# Patient Record
Sex: FEMALE | Race: ASIAN | NOT HISPANIC OR LATINO | Employment: UNEMPLOYED | URBAN - NONMETROPOLITAN AREA
[De-identification: names, ages, dates, MRNs, and addresses within clinical notes are randomized per-mention and may not be internally consistent; named-entity substitution may affect disease eponyms.]

---

## 2019-04-06 LAB — BACTERIA SPEC AEROBE CULT: NO GROWTH

## 2019-04-10 LAB
EXTERNAL ABO GROUPING: (no result)
EXTERNAL HEMOGLOBIN: 12.9 G/DL
EXTERNAL RH FACTOR: POSITIVE
EXTERNAL THYROID STIMULATING HORMONE: 1.59 M[IU]/ML

## 2019-04-16 LAB
EXTERNAL HEPATITIS B SURFACE ANTIGEN: NEGATIVE
EXTERNAL SYPHILIS RPR SCREEN: (no result)
GLUCOSE P FAST SERPL-MCNC: 84.4 MG/DL
HCV AB S/CO SERPL IA: NONREACTIVE
HIV 1+2 AB+HIV1 P24 AG SERPL QL IA: NONREACTIVE
RUBV IGG SERPL IA-ACNC: (no result)

## 2019-07-04 LAB — 2ND TRIMESTER 4 SCREEN SERPL-IMP: NEGATIVE

## 2019-08-03 LAB
EXTERNAL HEMATOCRIT: 30 %
EXTERNAL HEMOGLOBIN: 11.7 G/DL

## 2019-09-04 ENCOUNTER — APPOINTMENT (OUTPATIENT)
Dept: LAB | Facility: HOSPITAL | Age: 29
End: 2019-09-04

## 2019-09-04 ENCOUNTER — INITIAL PRENATAL (OUTPATIENT)
Dept: OBSTETRICS AND GYNECOLOGY | Facility: CLINIC | Age: 29
End: 2019-09-04

## 2019-09-04 VITALS — WEIGHT: 141 LBS | SYSTOLIC BLOOD PRESSURE: 110 MMHG | DIASTOLIC BLOOD PRESSURE: 79 MMHG

## 2019-09-04 VITALS — DIASTOLIC BLOOD PRESSURE: 79 MMHG | SYSTOLIC BLOOD PRESSURE: 110 MMHG

## 2019-09-04 DIAGNOSIS — Z36.89 ENCOUNTER FOR ULTRASOUND TO ASSESS FETAL ANATOMY AND GROWTH IN TWIN PREGNANCY, ANTEPARTUM: ICD-10-CM

## 2019-09-04 DIAGNOSIS — Z34.02 ENCOUNTER FOR SUPERVISION OF NORMAL FIRST PREGNANCY IN SECOND TRIMESTER: Primary | ICD-10-CM

## 2019-09-04 DIAGNOSIS — Z36.9 ANTENATAL SCREENING ENCOUNTER: ICD-10-CM

## 2019-09-04 DIAGNOSIS — O30.009 ENCOUNTER FOR ULTRASOUND TO ASSESS FETAL ANATOMY AND GROWTH IN TWIN PREGNANCY, ANTEPARTUM: ICD-10-CM

## 2019-09-04 LAB
AMPHET+METHAMPHET UR QL: NEGATIVE
AMPHETAMINES UR QL: NEGATIVE
BARBITURATES UR QL SCN: NEGATIVE
BENZODIAZ UR QL SCN: NEGATIVE
BUPRENORPHINE SERPL-MCNC: NEGATIVE NG/ML
CANNABINOIDS SERPL QL: NEGATIVE
COCAINE UR QL: NEGATIVE
GLUCOSE 1H P 100 G GLC PO SERPL-MCNC: 127 MG/DL (ref 60–140)
METHADONE UR QL SCN: NEGATIVE
OPIATES UR QL: NEGATIVE
OXYCODONE UR QL SCN: NEGATIVE
PCP UR QL SCN: NEGATIVE
PROPOXYPH UR QL: NEGATIVE
TRICYCLICS UR QL SCN: NEGATIVE

## 2019-09-04 PROCEDURE — 80306 DRUG TEST PRSMV INSTRMNT: CPT | Performed by: FAMILY MEDICINE

## 2019-09-04 PROCEDURE — 36415 COLL VENOUS BLD VENIPUNCTURE: CPT | Performed by: FAMILY MEDICINE

## 2019-09-04 PROCEDURE — 82950 GLUCOSE TEST: CPT | Performed by: FAMILY MEDICINE

## 2019-09-04 PROCEDURE — 99203 OFFICE O/P NEW LOW 30 MIN: CPT | Performed by: FAMILY MEDICINE

## 2019-09-04 RX ORDER — PRENATAL VIT NO.126/IRON/FOLIC 28MG-0.8MG
TABLET ORAL DAILY
COMMUNITY
End: 2021-04-07

## 2019-09-04 NOTE — PROGRESS NOTES
"[facility]  Obstetrics Visit    CHIEF COMPLAINT:  New prenatal visit    HISTORY OF PRESENT ILLNESS:  Marco A Childs is a 29 y.o. y/o  at 25w5d by definite LMP (Patient's last menstrual period was 2019.) with Estimated Date of Delivery: 19 confirmed by 1st trimester ultrasound.  Patient is transferring into the practice from [facility] and has had good prenatal care. She came back to the [facility] in 2019.    She report good fetal movement, no LOF, no VB, no contractions.     This was an unplanned but welcomed pregnancy and the patient is supported by [facility] \"[facility]". Reports nausea and reflux early on in her pregnancy but reports that is now resolved.   Denies breast tenderness. She denies any vaginal bleeding. She has started taking a prenatal vitamin.    REVIEW OF SYSTEMS  Review of Systems  GEN: no fever or chills  CVS: no chest pain or shortness of breath, no palpitations  RESP: no cough or wheeze  GI: no vomiting, no diarrhea or constipation  : no dysuria, no vaginal bleeding, no vaginal discharge, no dysuria  SKIN: no rash or lesions  PSYCH: no sleep disturbance, no depression    PRENATAL RISK FACTORS   Problems (from 19 to present)     No problems associated with this episode.          DATING CRITERIA:   Patient's last menstrual period was 2019. - Estimated Date of Delivery: 19 working  1TUS (19 at 12w2d) -- LARISA 12/15/19    OBSTETRIC HISTORY:  OB History    Para Term  AB Living   1             SAB TAB Ectopic Molar Multiple Live Births                    # Outcome Date GA Lbr Patrick/2nd Weight Sex Delivery Anes PTL Lv   1 Current                 GYN HISTORY:  Denies h/o sexually transmitted infections/pelvic inflammatory disease  Denies h/o abnormal pap smears, last pap was 2019 and reported normal  Denies h/o gynecologic surgeries, including biopsies of the cervix    PAST MEDICAL HISTORY:  Past Medical History:   Diagnosis Date "   • Abnormal laboratory test result    • Acne vulgaris    • Constipation    • Encounter for initial prescription of contraceptive pills    • Encounter for surveillance of contraceptive pills    • Hirsutism    • Irregular periods    • Left lower quadrant pain      PAST SURGICAL HISTORY:  History reviewed. No pertinent surgical history.  FAMILY HISTORY:  Family History   Problem Relation Age of Onset   • Breast cancer Mother    • Heart disease Maternal Grandfather    • Heart attack Maternal Grandfather    • Hyperlipidemia Paternal Grandmother    • Hypertension Paternal Grandmother    • Diabetes Father    • No Known Problems Brother    • No Known Problems Sister    • No Known Problems Sister      SOCIAL HISTORY:  Social History     Socioeconomic History   • Marital status: Single     Spouse name: Not on file   • Number of children: Not on file   • Years of education: Not on file   • Highest education level: Not on file   Tobacco Use   • Smoking status: Never Smoker   • Smokeless tobacco: Never Used   Substance and Sexual Activity   • Alcohol use: No   • Drug use: No   • Sexual activity: Yes     Partners: Male     GENETIC SCREENING:  Age >34 yo as of LARISA: no  Thalassemia: no  NTD: no  CHD: no  Down Syndrome/MR/Fragile X/Autism: no  Ashkenazi Rastafari with Andrew-Sachs, [place], familial dysautonomia: no  Sickle cell disease or trait: no  Hemophilia: no  Muscular dystrophy: no  Cystic fibrosis: no  Kristin's chorea: no  Birth defects: no  Genetic/chromosomal disorders: no    INFECTION HISTORY:  TB exposure: no  HSV: no  Illness since LMP: no  Prior GBS infected child: no  STIs: no    ALLERGIES:  No Known Allergies  MEDICATIONS:  Prior to Admission medications    Medication Sig Start Date End Date Taking? Authorizing Provider   Benzoyl Peroxide 10 % bar Wash face and rinse with warm water 1 to 2 times per day    Provider, MD Rio   drospirenone-ethinyl estradiol (GIANVI) 3-0.02 MG per tablet Take 1 tablet by mouth  Daily.    Provider, MD Rio   Prenatal Vit-Fe Fumarate-FA (PRENATAL, CLASSIC, VITAMIN) 28-0.8 MG tablet tablet Take  by mouth Daily.    Provider, MD Rio   tretinoin (RETIN-A) 0.025 % gel Apply  topically Every Night. as directed    ProviderRio MD     PHYSICAL EXAM:   /79   LMP 2019    FHT 136bpm  FH: 25cm    General: Alert, healthy, no distress, well nourished and well developed.  Neurologic: Alert, oriented to person, place, and time.  Gait normal.  Cranial nerves II-XII grossly intact.  HEENT: Normocephalic, atraumatic.  Extraocular muscles intact, pupils equal and reactive x2.    Teeth: Normal hygiene.  Neck: Supple, no adenopathy, thyroid normal size, non-tender, without nodularity, trachea midline.  Lungs: Normal respiratory effort.  Clear to auscultation bilaterally.  No wheezes, rhonci, or rales.  Heart: Regular rate and rhythm.  No murmer, rub or gallop.  Abdomen: Soft, non-tender, non-distended,no masses, no hepatosplenomegaly, no hernia.  Skin: No rash, no lesions.  Extremities: No cyanosis, clubbing or edema.  PELVIC EXAM: deferred    IMPRESSION:  Marco A Childs is a 29 y.o.  at 25w5d for a new prenatal visit.    PLAN:   Diagnosis Plan   1. Encounter for supervision of normal first pregnancy in second trimester  Glucose, Post 50 Gm Glucola   2.  screening encounter  Glucose, Post 50 Gm Glucola   3. Encounter for ultrasound to assess fetal anatomy and growth in twin pregnancy, antepartum  US Ob Detail Fetal Anatomy Each Additional Gestation     - Continue prenatal vitamins  - Weight gain counseling performed.   - BMI <18.5: Recommend 28-40 lb weight gain   - BMI 18.5-24.9: 25-35 lb   - BMI 25-29.9: 15-25 lb   - BMI >30: 11-20 lb  - Return to clinic in 4 weeks for return prenatal visit      Signature  Meredith Delcid MD  [facility]      This document has been electronically signed by Meredith Delcid MD on 2019 12:57  PM

## 2019-09-04 NOTE — PROGRESS NOTES
I spent approximately 60 minutes with the patient acquiring the health and history intake and discussing topics related to healthy lifestyle. This is her first pregnancy. She brought records with her today. She is a transfer from [place]. She had her lab tests and ultrasounds done in [place]. A newob bag is given. The 1st trimester teaching was done with the patient. We discussed a healthy diet and exercise and what is recommended. I also discussed Listeriosis and Toxoplasmosis and what fish to avoid due to high mercury levels. Informed patient not to be in hot tubs, saunas, or tanning beds. We discussed that spotting may occur after intercourse which is common, but if heavy bleeding like a period occurs to call the [facility] or hospital if clinic is closed.  I encouraged her to make an appointment with the dentist if she has not had a dental exam and cleaning in the last 6 months. I instructed the patient that alcohol, illicit drug use, and tobacco smoking should be avoided in pregnancy. The patient does not smoke. We discussed the hospital policy procedure if a patient has a positive urine drug screen at the time of admission to the hospital. She plans to breastfeed. I gave her pamphlet on breastfeeding classes and the breastfeeding mothers support group that is provided by Laila Barrios, Lactation Consultant. We discussed the resources that is offered at the health departments, and we discussed that some health departments have a breastfeeding peer counselor. I informed patient that group prenatal education classes are offered here. Patient is going to call me if she can attend the December group appointment. I discussed with the patient that a pediatrician needs to be chosen prior to delivery for the infant to have an appointment  scheduled before leaving the hospital.  She is doing a 1 hr glucola and a UDS today. She has had her genetic testing in [place] as well. I encouraged the patient to get the TDAP vaccine in  the 3rd trimester. I told the patient that health departments are giving the TDAP vaccine to family members. All questions were answered at this time.

## 2019-10-07 ENCOUNTER — ROUTINE PRENATAL (OUTPATIENT)
Dept: OBSTETRICS AND GYNECOLOGY | Facility: CLINIC | Age: 29
End: 2019-10-07

## 2019-10-07 VITALS — SYSTOLIC BLOOD PRESSURE: 102 MMHG | DIASTOLIC BLOOD PRESSURE: 64 MMHG | WEIGHT: 153.4 LBS

## 2019-10-07 DIAGNOSIS — Z34.03 ENCOUNTER FOR SUPERVISION OF NORMAL FIRST PREGNANCY IN THIRD TRIMESTER: Primary | ICD-10-CM

## 2019-10-07 DIAGNOSIS — O26.899 PAIN OF ROUND LIGAMENT DURING PREGNANCY: ICD-10-CM

## 2019-10-07 DIAGNOSIS — R10.2 PAIN OF ROUND LIGAMENT DURING PREGNANCY: ICD-10-CM

## 2019-10-07 PROCEDURE — 90756 CCIIV4 VACC ABX FREE IM: CPT | Performed by: FAMILY MEDICINE

## 2019-10-07 PROCEDURE — 90471 IMMUNIZATION ADMIN: CPT | Performed by: FAMILY MEDICINE

## 2019-10-07 PROCEDURE — 99213 OFFICE O/P EST LOW 20 MIN: CPT | Performed by: FAMILY MEDICINE

## 2019-10-07 PROCEDURE — 90715 TDAP VACCINE 7 YRS/> IM: CPT | Performed by: FAMILY MEDICINE

## 2019-10-07 NOTE — PROGRESS NOTES
CC: Prenatal visit    Marco A Childs is a 29 y.o.  at 30w3d.  Doing well. Denies contractions, LOF, or VB.  Reports good FM.  Complains of intermittent sharp pains in her pelvis when standing, pain goes away when she sits down or takes a warm shower.    /64   Wt 69.6 kg (153 lb 6.4 oz)   LMP 2019   SVE: deferred  Fundal Height (cm): 30 cm  Fetal Heart Rate: 147     Problems (from 19 to present)     Problem Noted Resolved    Encounter for supervision of normal first pregnancy in second trimester 2019 by Meredith Delcid MD No    Overview Signed 10/7/2019  8:21 AM by Meredith Delcid MD     Blood type/ Rubella Immune/ GBS unk  Dating: LMP = 1TUS (12wk)  Genetics: completed in [place]  Tdap: 28 wks  Flu: Needs  Anatomy: pending  1h Glucola: 127  Lab Results   Component Value Date    HGB 11.7 2019   Breastfeed  BC plan: undecided                 A/P: Marco A Childs is a 29 y.o.  at 30w3d.  - Needs Anatomy scan - pt will complete prior to her next office visit  - Tdap & Flu Vaccines given today  - PTL precautions discussed  - supportive care for round ligament pain discussed    - RTC in 4 weeks    Signature  Meredith Delcid MD  [facility] [address] [address]  Office: [phone]      This document has been electronically signed by Meredith Delcid MD on 2019 1:35 PM

## 2019-10-16 ENCOUNTER — ROUTINE PRENATAL (OUTPATIENT)
Dept: OBSTETRICS AND GYNECOLOGY | Facility: CLINIC | Age: 29
End: 2019-10-16

## 2019-10-16 ENCOUNTER — APPOINTMENT (OUTPATIENT)
Dept: LAB | Facility: HOSPITAL | Age: 29
End: 2019-10-16

## 2019-10-16 VITALS
SYSTOLIC BLOOD PRESSURE: 144 MMHG | WEIGHT: 156.8 LBS | HEIGHT: 62 IN | DIASTOLIC BLOOD PRESSURE: 88 MMHG | BODY MASS INDEX: 28.85 KG/M2

## 2019-10-16 DIAGNOSIS — O26.899 PAIN OF ROUND LIGAMENT DURING PREGNANCY: ICD-10-CM

## 2019-10-16 DIAGNOSIS — R10.2 PAIN OF ROUND LIGAMENT DURING PREGNANCY: ICD-10-CM

## 2019-10-16 DIAGNOSIS — O16.3 ELEVATED BLOOD PRESSURE COMPLICATING PREGNANCY IN THIRD TRIMESTER, ANTEPARTUM: ICD-10-CM

## 2019-10-16 DIAGNOSIS — Z36.89 NST (NON-STRESS TEST) REACTIVE: ICD-10-CM

## 2019-10-16 DIAGNOSIS — Z34.03 ENCOUNTER FOR SUPERVISION OF NORMAL FIRST PREGNANCY IN THIRD TRIMESTER: Primary | ICD-10-CM

## 2019-10-16 LAB
ALBUMIN SERPL-MCNC: 3.6 G/DL (ref 3.5–5.2)
ALBUMIN/GLOB SERPL: 1 G/DL
ALP SERPL-CCNC: 96 U/L (ref 39–117)
ALT SERPL W P-5'-P-CCNC: 10 U/L (ref 1–33)
ANION GAP SERPL CALCULATED.3IONS-SCNC: 12.3 MMOL/L (ref 5–15)
AST SERPL-CCNC: 13 U/L (ref 1–32)
BASOPHILS # BLD AUTO: 0.05 10*3/MM3 (ref 0–0.2)
BASOPHILS NFR BLD AUTO: 0.6 % (ref 0–1.5)
BILIRUB SERPL-MCNC: 0.2 MG/DL (ref 0.2–1.2)
BUN BLD-MCNC: 7 MG/DL (ref 6–20)
BUN/CREAT SERPL: 15.9 (ref 7–25)
CALCIUM SPEC-SCNC: 9.1 MG/DL (ref 8.6–10.5)
CHLORIDE SERPL-SCNC: 97 MMOL/L (ref 98–107)
CO2 SERPL-SCNC: 21.7 MMOL/L (ref 22–29)
CREAT BLD-MCNC: 0.44 MG/DL (ref 0.57–1)
DEPRECATED RDW RBC AUTO: 38.8 FL (ref 37–54)
EOSINOPHIL # BLD AUTO: 0.1 10*3/MM3 (ref 0–0.4)
EOSINOPHIL NFR BLD AUTO: 1.1 % (ref 0.3–6.2)
ERYTHROCYTE [DISTWIDTH] IN BLOOD BY AUTOMATED COUNT: 12.7 % (ref 12.3–15.4)
GFR SERPL CREATININE-BSD FRML MDRD: >150 ML/MIN/1.73
GFR SERPL CREATININE-BSD FRML MDRD: >150 ML/MIN/1.73
GLOBULIN UR ELPH-MCNC: 3.5 GM/DL
GLUCOSE BLD-MCNC: 97 MG/DL (ref 65–99)
HCT VFR BLD AUTO: 36.1 % (ref 34–46.6)
HGB BLD-MCNC: 12.4 G/DL (ref 12–15.9)
IMM GRANULOCYTES # BLD AUTO: 0.09 10*3/MM3 (ref 0–0.05)
IMM GRANULOCYTES NFR BLD AUTO: 1 % (ref 0–0.5)
LYMPHOCYTES # BLD AUTO: 1.6 10*3/MM3 (ref 0.7–3.1)
LYMPHOCYTES NFR BLD AUTO: 17.7 % (ref 19.6–45.3)
MCH RBC QN AUTO: 28.6 PG (ref 26.6–33)
MCHC RBC AUTO-ENTMCNC: 34.3 G/DL (ref 31.5–35.7)
MCV RBC AUTO: 83.4 FL (ref 79–97)
MONOCYTES # BLD AUTO: 0.56 10*3/MM3 (ref 0.1–0.9)
MONOCYTES NFR BLD AUTO: 6.2 % (ref 5–12)
NEUTROPHILS # BLD AUTO: 6.64 10*3/MM3 (ref 1.7–7)
NEUTROPHILS NFR BLD AUTO: 73.4 % (ref 42.7–76)
NRBC BLD AUTO-RTO: 0 /100 WBC (ref 0–0.2)
PLATELET # BLD AUTO: 334 10*3/MM3 (ref 140–450)
PMV BLD AUTO: 9.7 FL (ref 6–12)
POTASSIUM BLD-SCNC: 4.1 MMOL/L (ref 3.5–5.2)
PROT SERPL-MCNC: 7.1 G/DL (ref 6–8.5)
RBC # BLD AUTO: 4.33 10*6/MM3 (ref 3.77–5.28)
SODIUM BLD-SCNC: 131 MMOL/L (ref 136–145)
WBC NRBC COR # BLD: 9.04 10*3/MM3 (ref 3.4–10.8)

## 2019-10-16 PROCEDURE — 36415 COLL VENOUS BLD VENIPUNCTURE: CPT | Performed by: FAMILY MEDICINE

## 2019-10-16 PROCEDURE — 85025 COMPLETE CBC W/AUTO DIFF WBC: CPT | Performed by: FAMILY MEDICINE

## 2019-10-16 PROCEDURE — 59025 FETAL NON-STRESS TEST: CPT | Performed by: FAMILY MEDICINE

## 2019-10-16 PROCEDURE — 80053 COMPREHEN METABOLIC PANEL: CPT | Performed by: FAMILY MEDICINE

## 2019-10-16 PROCEDURE — 99213 OFFICE O/P EST LOW 20 MIN: CPT | Performed by: FAMILY MEDICINE

## 2019-10-16 NOTE — PROGRESS NOTES
"CC: Prenatal visit    Marco A Childs is a 29 y.o.  at 31w5d.  Doing well.  No complaints.  Denies contractions, LOF, or VB.  Reports good FM.  Denies HA/Visual disturbance/RUQ pain.  Reports occasional lower leg swelling that resolves if she props her feet up.  Patient states she is nervous today because she was sitting in the waiting room for a little while before her visit.     /88   Ht 157.5 cm (62\")   Wt 71.1 kg (156 lb 12.8 oz)   LMP 2019   BMI 28.68 kg/m²    Repeat blood pressure 128/84   Fundal Height (cm): 31 cm  Fetal Heart Rate: 142       Problems (from 19 to present)     Problem Noted Resolved    Elevated blood pressure complicating pregnancy in third trimester, antepartum 10/16/2019 by Meredith Delcid MD No    Pain of round ligament during pregnancy 10/7/2019 by Meredith Delcid MD No    Encounter for supervision of normal first pregnancy in third trimester 2019 by Meredith Delcid MD No    Overview Addendum 10/7/2019  2:03 PM by Meredith Delcid MD     Blood type/ Rubella Immune/ GBS unk  Dating: LMP = 1TUS (12wk)  Genetics: completed in [place]  Tdap: given 10/7/19  Flu: given 10/7/19  Anatomy: pending  1h Glucola: 127  Lab Results   Component Value Date    HGB 11.7 2019   Breastfeed  BC plan: undecided               A/P: Marco A Childs is a 29 y.o.  at 31w5d.  - RTC tomorrow for nursing only visit - Blood pressure check  - CMP, CBC, 24hr Urine protein ordered  - NST reactive    - RTC in 1 weeks with physician  - warning signs/symptoms of Pre-Eclampsia reviewed with patient    Signature  Meredith Delcid MD  [facility]'s [address] [address]  Office: [phone]      This document has been electronically signed by Meredith Delcid MD on 2019 9:43 AM    "

## 2019-10-17 ENCOUNTER — CLINICAL SUPPORT (OUTPATIENT)
Dept: OBSTETRICS AND GYNECOLOGY | Facility: CLINIC | Age: 29
End: 2019-10-17

## 2019-10-17 VITALS — DIASTOLIC BLOOD PRESSURE: 72 MMHG | SYSTOLIC BLOOD PRESSURE: 124 MMHG

## 2019-10-17 DIAGNOSIS — Z01.30 BLOOD PRESSURE CHECK: ICD-10-CM

## 2019-10-18 ENCOUNTER — LAB (OUTPATIENT)
Dept: LAB | Facility: HOSPITAL | Age: 29
End: 2019-10-18

## 2019-10-18 DIAGNOSIS — O16.3 ELEVATED BLOOD PRESSURE COMPLICATING PREGNANCY IN THIRD TRIMESTER, ANTEPARTUM: ICD-10-CM

## 2019-10-18 LAB
COLLECT DURATION TIME UR: 24 HRS
PROT 24H UR-MRATE: 108 MG/24HOURS (ref 0–150)
PROT UR-MCNC: 4 MG/DL
SPECIMEN VOL 24H UR: 2700 ML

## 2019-10-18 PROCEDURE — 81050 URINALYSIS VOLUME MEASURE: CPT

## 2019-10-18 PROCEDURE — 84156 ASSAY OF PROTEIN URINE: CPT

## 2019-10-23 ENCOUNTER — ROUTINE PRENATAL (OUTPATIENT)
Dept: OBSTETRICS AND GYNECOLOGY | Facility: CLINIC | Age: 29
End: 2019-10-23

## 2019-10-23 VITALS — BODY MASS INDEX: 28.83 KG/M2 | SYSTOLIC BLOOD PRESSURE: 122 MMHG | DIASTOLIC BLOOD PRESSURE: 80 MMHG | WEIGHT: 157.6 LBS

## 2019-10-23 DIAGNOSIS — O26.899 PAIN OF ROUND LIGAMENT DURING PREGNANCY: ICD-10-CM

## 2019-10-23 DIAGNOSIS — R10.2 PAIN OF ROUND LIGAMENT DURING PREGNANCY: ICD-10-CM

## 2019-10-23 DIAGNOSIS — Z3A.32 32 WEEKS GESTATION OF PREGNANCY: ICD-10-CM

## 2019-10-23 DIAGNOSIS — Z34.03 ENCOUNTER FOR SUPERVISION OF NORMAL FIRST PREGNANCY IN THIRD TRIMESTER: Primary | ICD-10-CM

## 2019-10-23 PROBLEM — O16.3 ELEVATED BLOOD PRESSURE COMPLICATING PREGNANCY IN THIRD TRIMESTER, ANTEPARTUM: Status: RESOLVED | Noted: 2019-10-16 | Resolved: 2019-10-23

## 2019-10-23 PROCEDURE — 99213 OFFICE O/P EST LOW 20 MIN: CPT | Performed by: FAMILY MEDICINE

## 2019-10-23 NOTE — PROGRESS NOTES
CC: Prenatal visit    Marco A Childs is a 29 y.o.  at 32w5d.  Doing well.  No complaints.  Denies contractions, LOF, or VB.  Reports good FM.  No HA/Visual disturbance or RUQ pain.  No edema.    24hr urine protein normal: 108  LFTs wnl.     /80   Wt 71.5 kg (157 lb 9.6 oz)   LMP 2019   BMI 28.83 kg/m²   Fundal Height (cm): 32 cm  Fetal Heart Rate: 150     Problems (from 19 to present)     Problem Noted Resolved    Pain of round ligament during pregnancy 10/7/2019 by Meredith Delcid MD No    Encounter for supervision of normal first pregnancy in third trimester 2019 by Meredith Delcid MD No    Overview Addendum 10/7/2019  2:03 PM by Meredith Delcid MD     Blood type/ Rubella Immune/ GBS unk  Dating: LMP = 1TUS (12wk)  Genetics: completed in [place]  Tdap: given 10/7/19  Flu: given 10/7/19  Anatomy: pending  1h Glucola: 127  Lab Results   Component Value Date    HGB 11.7 2019   Breastfeed  BC plan: undecided           Elevated blood pressure complicating pregnancy in third trimester, antepartum 10/16/2019 by Meredith Delcid MD 10/23/2019 by Meredith Delcid MD          A/P: Marco A Childs is a 29 y.o.  at 32w5d.  - RTC in 2 weeks  - PTL precautions discussed  - Kick counts reviewed    Signature  Meredith Delcid MD  [facility] [place] [address]  Office: [phone]      This document has been electronically signed by Meredith Delcid MD on 2019 11:25 AM

## 2019-11-04 ENCOUNTER — ROUTINE PRENATAL (OUTPATIENT)
Dept: OBSTETRICS AND GYNECOLOGY | Facility: CLINIC | Age: 29
End: 2019-11-04

## 2019-11-04 VITALS — DIASTOLIC BLOOD PRESSURE: 68 MMHG | SYSTOLIC BLOOD PRESSURE: 104 MMHG | BODY MASS INDEX: 29.56 KG/M2 | WEIGHT: 161.6 LBS

## 2019-11-04 DIAGNOSIS — Z3A.34 34 WEEKS GESTATION OF PREGNANCY: ICD-10-CM

## 2019-11-04 DIAGNOSIS — Z34.03 ENCOUNTER FOR SUPERVISION OF NORMAL FIRST PREGNANCY IN THIRD TRIMESTER: Primary | ICD-10-CM

## 2019-11-04 PROCEDURE — 99213 OFFICE O/P EST LOW 20 MIN: CPT | Performed by: FAMILY MEDICINE

## 2019-11-04 NOTE — PROGRESS NOTES
CC: Prenatal visit    Marco A Childs is a 29 y.o.  at 34w3d.  Doing well.  No complaints.  Denies contractions, LOF, or VB.  Reports good FM.  Does report increased cervical mucous.    /68   Wt 73.3 kg (161 lb 9.6 oz)   LMP 2019   BMI 29.56 kg/m²   SVE: deferred  Fundal Height (cm): 34 cm  Fetal Heart Rate: 135     Problems (from 19 to present)     Problem Noted Resolved    Pain of round ligament during pregnancy 10/7/2019 by Meredith Delcid MD No    Encounter for supervision of normal first pregnancy in third trimester 2019 by Meredith Delcid MD No    Overview Addendum 2019  1:18 PM by Meredith Delcid MD     O POS/ Rubella Immune/ GBS unk  Dating: LMP = 1TUS (12wk)  Genetics: completed in [place]  Tdap: given 10/7/19  Flu: given 10/7/19  Anatomy: wnl  1h Glucola: 127  Lab Results   Component Value Date    HGB 11.7 2019   Breastfeed  BC plan: undecided           Elevated blood pressure complicating pregnancy in third trimester, antepartum 10/16/2019 by Meredith Delcid MD 10/23/2019 by Meredith Delcid MD          A/P: Marco A Childs is a 29 y.o.  at 34w3d.  - RTC in 2 weeks will obtain GBS at this visit  - Kick counts reviewed  - PTL precautions reviewed     Diagnosis Plan   1. Encounter for supervision of normal first pregnancy in third trimester     2. 34 weeks gestation of pregnancy       Signature  Meredith Delcid MD  [facility]  [address] [place] [address]  Office: [phone]      This document has been electronically signed by Meredith Delcid MD on 2019 1:15 PM

## 2019-11-09 ENCOUNTER — ANESTHESIA EVENT (OUTPATIENT)
Dept: LABOR AND DELIVERY | Facility: HOSPITAL | Age: 29
End: 2019-11-09

## 2019-11-09 ENCOUNTER — ANESTHESIA (OUTPATIENT)
Dept: LABOR AND DELIVERY | Facility: HOSPITAL | Age: 29
End: 2019-11-09

## 2019-11-09 ENCOUNTER — HOSPITAL ENCOUNTER (INPATIENT)
Facility: HOSPITAL | Age: 29
LOS: 2 days | Discharge: HOME | End: 2019-11-11
Attending: OBSTETRICS & GYNECOLOGY | Admitting: OBSTETRICS & GYNECOLOGY

## 2019-11-09 PROBLEM — Z34.90 PREGNANCY: Status: ACTIVE | Noted: 2019-11-09

## 2019-11-09 LAB
ABO GROUP BLD: NORMAL
ABO GROUP BLD: NORMAL
AMPHET+METHAMPHET UR QL: NEGATIVE
AMPHETAMINES UR QL: NEGATIVE
BARBITURATES UR QL SCN: NEGATIVE
BENZODIAZ UR QL SCN: NEGATIVE
BLD GP AB SCN SERPL QL: NEGATIVE
BUPRENORPHINE SERPL-MCNC: NEGATIVE NG/ML
CANNABINOIDS SERPL QL: NEGATIVE
COCAINE UR QL: NEGATIVE
DEPRECATED RDW RBC AUTO: 39.9 FL (ref 37–54)
ERYTHROCYTE [DISTWIDTH] IN BLOOD BY AUTOMATED COUNT: 13.3 % (ref 12.3–15.4)
HCT VFR BLD AUTO: 35.4 % (ref 34–46.6)
HGB BLD-MCNC: 12.3 G/DL (ref 12–15.9)
Lab: NORMAL
MCH RBC QN AUTO: 28.7 PG (ref 26.6–33)
MCHC RBC AUTO-ENTMCNC: 34.7 G/DL (ref 31.5–35.7)
MCV RBC AUTO: 82.7 FL (ref 79–97)
METHADONE UR QL SCN: NEGATIVE
OPIATES UR QL: NEGATIVE
OXYCODONE UR QL SCN: NEGATIVE
PCP UR QL SCN: NEGATIVE
PLATELET # BLD AUTO: 279 10*3/MM3 (ref 140–450)
PMV BLD AUTO: 9.1 FL (ref 6–12)
PROPOXYPH UR QL: NEGATIVE
RBC # BLD AUTO: 4.28 10*6/MM3 (ref 3.77–5.28)
RH BLD: POSITIVE
RH BLD: POSITIVE
T&S EXPIRATION DATE: NORMAL
TRICYCLICS UR QL SCN: NEGATIVE
WBC NRBC COR # BLD: 9.03 10*3/MM3 (ref 3.4–10.8)

## 2019-11-09 PROCEDURE — 25010000002 FENTANYL CITRATE (PF) 100 MCG/2ML SOLUTION: Performed by: NURSE ANESTHETIST, CERTIFIED REGISTERED

## 2019-11-09 PROCEDURE — 86850 RBC ANTIBODY SCREEN: CPT | Performed by: OBSTETRICS & GYNECOLOGY

## 2019-11-09 PROCEDURE — 86901 BLOOD TYPING SEROLOGIC RH(D): CPT

## 2019-11-09 PROCEDURE — 86900 BLOOD TYPING SEROLOGIC ABO: CPT | Performed by: OBSTETRICS & GYNECOLOGY

## 2019-11-09 PROCEDURE — C1755 CATHETER, INTRASPINAL: HCPCS

## 2019-11-09 PROCEDURE — 85027 COMPLETE CBC AUTOMATED: CPT | Performed by: OBSTETRICS & GYNECOLOGY

## 2019-11-09 PROCEDURE — 0KQM0ZZ REPAIR PERINEUM MUSCLE, OPEN APPROACH: ICD-10-PCS | Performed by: OBSTETRICS & GYNECOLOGY

## 2019-11-09 PROCEDURE — 25010000003 PENICILLIN G POTASSIUM PER 600000 UNITS: Performed by: OBSTETRICS & GYNECOLOGY

## 2019-11-09 PROCEDURE — 25010000003 LIDOCAINE 1 % SOLUTION: Performed by: NURSE ANESTHETIST, CERTIFIED REGISTERED

## 2019-11-09 PROCEDURE — 51702 INSERT TEMP BLADDER CATH: CPT

## 2019-11-09 PROCEDURE — C1755 CATHETER, INTRASPINAL: HCPCS | Performed by: NURSE ANESTHETIST, CERTIFIED REGISTERED

## 2019-11-09 PROCEDURE — 86900 BLOOD TYPING SEROLOGIC ABO: CPT

## 2019-11-09 PROCEDURE — 86901 BLOOD TYPING SEROLOGIC RH(D): CPT | Performed by: OBSTETRICS & GYNECOLOGY

## 2019-11-09 PROCEDURE — 59410 OBSTETRICAL CARE: CPT | Performed by: OBSTETRICS & GYNECOLOGY

## 2019-11-09 PROCEDURE — 80307 DRUG TEST PRSMV CHEM ANLYZR: CPT | Performed by: OBSTETRICS & GYNECOLOGY

## 2019-11-09 PROCEDURE — 99024 POSTOP FOLLOW-UP VISIT: CPT | Performed by: STUDENT IN AN ORGANIZED HEALTH CARE EDUCATION/TRAINING PROGRAM

## 2019-11-09 RX ORDER — BUPIVACAINE HYDROCHLORIDE 2.5 MG/ML
INJECTION, SOLUTION EPIDURAL; INFILTRATION; INTRACAUDAL AS NEEDED
Status: DISCONTINUED | OUTPATIENT
Start: 2019-11-09 | End: 2019-11-09 | Stop reason: SURG

## 2019-11-09 RX ORDER — SODIUM CHLORIDE 0.9 % (FLUSH) 0.9 %
3 SYRINGE (ML) INJECTION EVERY 12 HOURS SCHEDULED
Status: DISCONTINUED | OUTPATIENT
Start: 2019-11-09 | End: 2019-11-09

## 2019-11-09 RX ORDER — SODIUM CHLORIDE 0.9 % (FLUSH) 0.9 %
10 SYRINGE (ML) INJECTION AS NEEDED
Status: DISCONTINUED | OUTPATIENT
Start: 2019-11-09 | End: 2019-11-09

## 2019-11-09 RX ORDER — CARBOPROST TROMETHAMINE 250 UG/ML
250 INJECTION, SOLUTION INTRAMUSCULAR AS NEEDED
Status: DISCONTINUED | OUTPATIENT
Start: 2019-11-09 | End: 2019-11-09 | Stop reason: HOSPADM

## 2019-11-09 RX ORDER — SODIUM CHLORIDE, SODIUM LACTATE, POTASSIUM CHLORIDE, CALCIUM CHLORIDE 600; 310; 30; 20 MG/100ML; MG/100ML; MG/100ML; MG/100ML
INJECTION, SOLUTION INTRAVENOUS
Status: COMPLETED
Start: 2019-11-09 | End: 2019-11-09

## 2019-11-09 RX ORDER — SODIUM CHLORIDE 0.9 % (FLUSH) 0.9 %
1-10 SYRINGE (ML) INJECTION AS NEEDED
Status: DISCONTINUED | OUTPATIENT
Start: 2019-11-09 | End: 2019-11-11 | Stop reason: HOSPADM

## 2019-11-09 RX ORDER — DIPHENHYDRAMINE HCL 25 MG
25 CAPSULE ORAL NIGHTLY PRN
Status: DISCONTINUED | OUTPATIENT
Start: 2019-11-09 | End: 2019-11-11 | Stop reason: HOSPADM

## 2019-11-09 RX ORDER — OXYTOCIN/0.9 % SODIUM CHLORIDE 30/500 ML
PLASTIC BAG, INJECTION (ML) INTRAVENOUS
Status: DISPENSED
Start: 2019-11-09 | End: 2019-11-09

## 2019-11-09 RX ORDER — FENTANYL CITRATE 50 UG/ML
INJECTION, SOLUTION INTRAMUSCULAR; INTRAVENOUS AS NEEDED
Status: DISCONTINUED | OUTPATIENT
Start: 2019-11-09 | End: 2019-11-09 | Stop reason: SURG

## 2019-11-09 RX ORDER — SODIUM CHLORIDE, SODIUM LACTATE, POTASSIUM CHLORIDE, CALCIUM CHLORIDE 600; 310; 30; 20 MG/100ML; MG/100ML; MG/100ML; MG/100ML
125 INJECTION, SOLUTION INTRAVENOUS CONTINUOUS
Status: DISCONTINUED | OUTPATIENT
Start: 2019-11-09 | End: 2019-11-11 | Stop reason: HOSPADM

## 2019-11-09 RX ORDER — HYDROCODONE BITARTRATE AND ACETAMINOPHEN 5; 325 MG/1; MG/1
1 TABLET ORAL EVERY 4 HOURS PRN
Status: DISCONTINUED | OUTPATIENT
Start: 2019-11-09 | End: 2019-11-11 | Stop reason: HOSPADM

## 2019-11-09 RX ORDER — MISOPROSTOL 200 UG/1
800 TABLET ORAL AS NEEDED
Status: DISCONTINUED | OUTPATIENT
Start: 2019-11-09 | End: 2019-11-09 | Stop reason: HOSPADM

## 2019-11-09 RX ORDER — METHYLERGONOVINE MALEATE 0.2 MG/ML
200 INJECTION INTRAVENOUS ONCE AS NEEDED
Status: DISCONTINUED | OUTPATIENT
Start: 2019-11-09 | End: 2019-11-09 | Stop reason: HOSPADM

## 2019-11-09 RX ORDER — BISACODYL 10 MG
10 SUPPOSITORY, RECTAL RECTAL DAILY PRN
Status: DISCONTINUED | OUTPATIENT
Start: 2019-11-10 | End: 2019-11-11 | Stop reason: HOSPADM

## 2019-11-09 RX ORDER — LIDOCAINE HYDROCHLORIDE 10 MG/ML
INJECTION, SOLUTION INFILTRATION; PERINEURAL AS NEEDED
Status: DISCONTINUED | OUTPATIENT
Start: 2019-11-09 | End: 2019-11-09 | Stop reason: SURG

## 2019-11-09 RX ORDER — IBUPROFEN 600 MG/1
600 TABLET ORAL EVERY 8 HOURS PRN
Status: DISCONTINUED | OUTPATIENT
Start: 2019-11-09 | End: 2019-11-11 | Stop reason: HOSPADM

## 2019-11-09 RX ORDER — LIDOCAINE HYDROCHLORIDE AND EPINEPHRINE 15; 5 MG/ML; UG/ML
INJECTION, SOLUTION EPIDURAL AS NEEDED
Status: DISCONTINUED | OUTPATIENT
Start: 2019-11-09 | End: 2019-11-09 | Stop reason: SURG

## 2019-11-09 RX ORDER — DOCUSATE SODIUM 100 MG/1
100 CAPSULE, LIQUID FILLED ORAL 2 TIMES DAILY PRN
Status: DISCONTINUED | OUTPATIENT
Start: 2019-11-09 | End: 2019-11-11 | Stop reason: HOSPADM

## 2019-11-09 RX ADMIN — FENTANYL CITRATE 50 MCG: 50 INJECTION, SOLUTION INTRAMUSCULAR; INTRAVENOUS at 08:17

## 2019-11-09 RX ADMIN — LIDOCAINE HYDROCHLORIDE AND EPINEPHRINE 3 ML: 15; 5 INJECTION, SOLUTION EPIDURAL at 08:05

## 2019-11-09 RX ADMIN — FENTANYL CITRATE 50 MCG: 50 INJECTION, SOLUTION INTRAMUSCULAR; INTRAVENOUS at 08:12

## 2019-11-09 RX ADMIN — SODIUM CHLORIDE, SODIUM LACTATE, POTASSIUM CHLORIDE, CALCIUM CHLORIDE 125 ML/HR: 600; 310; 30; 20 INJECTION, SOLUTION INTRAVENOUS at 07:37

## 2019-11-09 RX ADMIN — SODIUM CHLORIDE, POTASSIUM CHLORIDE, SODIUM LACTATE AND CALCIUM CHLORIDE 1000 ML: 600; 310; 30; 20 INJECTION, SOLUTION INTRAVENOUS at 07:11

## 2019-11-09 RX ADMIN — IBUPROFEN 600 MG: 600 TABLET ORAL at 22:11

## 2019-11-09 RX ADMIN — LIDOCAINE HYDROCHLORIDE 3 ML: 10 INJECTION, SOLUTION INFILTRATION; PERINEURAL at 07:57

## 2019-11-09 RX ADMIN — BUPIVACAINE HYDROCHLORIDE 5 ML: 2.5 INJECTION, SOLUTION EPIDURAL; INFILTRATION; INTRACAUDAL; PERINEURAL at 08:12

## 2019-11-09 RX ADMIN — Medication 10 ML/HR: at 08:20

## 2019-11-09 RX ADMIN — SODIUM CHLORIDE, POTASSIUM CHLORIDE, SODIUM LACTATE AND CALCIUM CHLORIDE 125 ML/HR: 600; 310; 30; 20 INJECTION, SOLUTION INTRAVENOUS at 07:37

## 2019-11-09 RX ADMIN — SODIUM CHLORIDE 5 MILLION UNITS: 9 INJECTION, SOLUTION INTRAVENOUS at 07:41

## 2019-11-09 RX ADMIN — BUPIVACAINE HYDROCHLORIDE 5 ML: 2.5 INJECTION, SOLUTION EPIDURAL; INFILTRATION; INTRACAUDAL; PERINEURAL at 08:17

## 2019-11-09 NOTE — PLAN OF CARE
Problem: Patient Care Overview  Goal: Plan of Care Review  Outcome: Ongoing (interventions implemented as appropriate)   11/09/19 1354   Coping/Psychosocial   Plan of Care Reviewed With patient   Plan of Care Review   Progress improving   OTHER   Outcome Summary vss, voiding, fundus firm, rubra light with no clots, ambulating in room     Goal: Individualization and Mutuality  Outcome: Ongoing (interventions implemented as appropriate)    Goal: Discharge Needs Assessment  Outcome: Ongoing (interventions implemented as appropriate)    Goal: Interprofessional Rounds/Family Conf  Outcome: Ongoing (interventions implemented as appropriate)

## 2019-11-09 NOTE — PLAN OF CARE
Problem: Labor (Cervical Ripen, Induct, Augment) (Adult,Obstetrics,Pediatric)  Goal: Signs and Symptoms of Listed Potential Problems Will be Absent, Minimized or Managed (Labor)  Outcome: Outcome(s) achieved Date Met: 11/09/19      Problem: Anesthesia/Analgesia, Neuraxial (Obstetrics)  Goal: Signs and Symptoms of Listed Potential Problems Will be Absent, Minimized or Managed (Anesthesia/Analgesia, Neuraxial)  Outcome: Ongoing (interventions implemented as appropriate)

## 2019-11-09 NOTE — ANESTHESIA PROCEDURE NOTES
Labor Epidural      Patient reassessed immediately prior to procedure    Patient location during procedure: OB  Start Time: 11/9/2019 7:59 AM  Stop Time: 11/9/2019 8:04 AM  Preanesthetic Checklist  Completed: patient identified, site marked, surgical consent, pre-op evaluation, timeout performed, IV checked, risks and benefits discussed and monitors and equipment checked  Additional Notes  Perifix FX epidural kit lot #[id number]  Expires 02/21Bupivicaine .25% lot#[id number]  Expires 12/20  Prep:  Pt Position:sitting  Sterile Tech:cap, gloves, mask and sterile barrier  Prep:povidone-iodine 7.5% surgical scrub  Monitoring:blood pressure monitoring and continuous pulse oximetry  Epidural Block Procedure:  Approach:midline  Guidance:landmark technique and palpation technique  Location:L3-L4  Needle Type:Tuohy  Needle Gauge:19 G  Loss of Resistance Medium: saline  Loss of Resistance: 8cm  Cath Depth at skin:12 cm  Paresthesia: none  Aspiration:negative  Test Dose:negative  Number of Attempts: 1  Post Assessment:  Dressing:occlusive dressing applied and secured with tape  Pt Tolerance:patient tolerated the procedure well with no apparent complications

## 2019-11-09 NOTE — H&P
[facility]   Obstetric History and Physical    Chief Complaint   Patient presents with   • Rupture of Membranes     around 0530 this AM           Patient is a 29 y.o. female  currently at 35w1d with no significant past medical history, who presents with rupture of her membranes at 0530 this morning and in labor. She is currently experiencing contractions every 2 minutes and has been feeling baby move. Her contractions have begun to increase in pain. She is from [place], but brought her pregnancy documentation with her. She is taking her prenatal vitamins.    Her prenatal care is benign.       Obstetric History   #: 1, Date: None, Sex: None, Weight: None, GA: None, Delivery: None, Apgar1: None, Apgar5: None, Living: None, Birth Comments: None       The following portions of the patients history were reviewed and updated as appropriate: current medications, allergies, past medical history, past surgical history, past family history, past social history and problem list .       Prenatal Information:  Prenatal Results     Initial Prenatal Labs     Test Value Reference Range Date Time    Hemoglobin 11.7 g/dL g/dL 19     Hematocrit 30 % % 19     Platelets 279 10*3/mm3 140 - 450 10*3/mm3 19 0715    Rubella IgG immune   19     Hepatitis B SAg Negative   19     Hepatitis C Ab nonreactive   19     RPR Non-Reactive   19     ABO O   19 0715    Rh Positive   19 0715    Antibody Screen        HIV nonreactive   19     Urine Culture No growth  No growth at 24 hours, No growth at 2 days, No growth at 3 days, No growth, Growth present, too young to evaluate, Culture in progress 19     Gonorrhea        Chlamydia        TSH 1.59 m[IU]/mL m[IU]/mL 04/10/19           2nd and 3rd Trimester     Test Value Reference Range Date Time    Hemoglobin (repeated) 12.3 g/dL 12.0 - 15.9 g/dL 19 0715    Hematocrit (repeated) 35.4 % 34.0 - 46.6 % 19 0715    GCT  127 mg/dL 60 - 140 mg/dL 09/04/19 1321    Antibody Screen (repeated) Negative   11/09/19 0715    GTT Fasting 84.4   04/16/19     GTT 1 Hr        GTT 2 Hr        GTT 3 Hr        Group B Strep              Drug Screening     Test Value Reference Range Date Time    Amphetamine Screen Negative  Negative 09/04/19 1321    Barbiturate Screen Negative  Negative 09/04/19 1321    Benzodiazepine Screen Negative  Negative 09/04/19 1321    Methadone Screen Negative  Negative 09/04/19 1321    Phencyclidine Screen Negative  Negative 09/04/19 1321    Opiates Screen Negative  Negative 09/04/19 1321    THC Screen Negative  Negative 09/04/19 1321    Cocaine Screen Negative  Negative 09/04/19 1321    Propoxyphene Screen Negative  Negative 09/04/19 1321    Buprenorphine Screen Negative  Negative 09/04/19 1321    Methamphetamine Screen Negative  Negative 09/04/19 1321    Oxycodone Screen Negative  Negative 09/04/19 1321    Tricyclic Antidepressants Screen Negative  Negative 09/04/19 1321          Other (Risk screening)     Test Value Reference Range Date Time    Varicella IgG        Parvovirus IgG        CMV IgG        Cystic Fibrosis        Hemoglobin electrophoresis        NIPT        MSAFP-4 Negative   07/04/19     AFP (for NTD only)                  External Prenatal Results     Pregnancy Outside Results - Transcribed From Office Records - See Scanned Records For Details     Test Value Date Time    Hgb 12.3 g/dL 11/09/19 0715    Hct 35.4 % 11/09/19 0715    ABO O  11/09/19 0715    Rh Positive  11/09/19 0715    Antibody Screen Negative  11/09/19 0715    Glucose Fasting GTT 84.4  04/16/19     Glucose Tolerance Test 1 hour       Glucose Tolerance Test 3 hour       Gonorrhea (discrete)       Chlamydia (discrete)       RPR Non-Reactive  04/16/19     VDRL       Syphilis Antibody       Rubella immune  04/16/19     HBsAg Negative  04/16/19     Herpes Simplex Virus PCR       Herpes Simplex VIrus Culture       HIV nonreactive  04/16/19     Hep  C RNA Quant PCR       Hep C Antibody nonreactive  19     AFP       Group B Strep       GBS Susceptibility to Clindamycin       GBS Susceptibility to Erythromycin       Fetal Fibronectin       Genetic Testing, Maternal Blood             Drug Screening     Test Value Date Time    Urine Drug Screen       Amphetamine Screen Negative  19 1321    Barbiturate Screen Negative  19 1321    Benzodiazepine Screen Negative  19 1321    Methadone Screen Negative  19 1321    Phencyclidine Screen Negative  19 1321    Opiates Screen Negative  19 1321    THC Screen Negative  19 1321    Cocaine Screen       Propoxyphene Screen Negative  19 1321    Buprenorphine Screen Negative  19 1321    Methamphetamine Screen       Oxycodone Screen Negative  19 1321    Tricyclic Antidepressants Screen Negative  19 1321                 Past OB History:     Obstetric History       T0      L0     SAB0   TAB0   Ectopic0   Molar0   Multiple0   Live Births0       # Outcome Date GA Lbr Patrick/2nd Weight Sex Delivery Anes PTL Lv   1 Current                    ALLERGIES:   No Known Allergies     Home Medications:     Prior to Admission medications    Medication Sig Start Date End Date Taking? Authorizing Provider   Prenatal Vit-Fe Fumarate-FA (PRENATAL, CLASSIC, VITAMIN) 28-0.8 MG tablet tablet Take  by mouth Daily.   Yes Provider, MD Rio       Past Medical History: Past Medical History:   Diagnosis Date   • Abnormal laboratory test result    • Acne vulgaris    • Constipation    • Encounter for initial prescription of contraceptive pills    • Encounter for surveillance of contraceptive pills    • Hirsutism    • Irregular periods    • Left lower quadrant pain       Past Surgical History History reviewed. No pertinent surgical history.   Family History: Family History   Problem Relation Age of Onset   • Breast cancer Mother    • Heart disease Maternal Grandfather    •  "Heart attack Maternal Grandfather    • Hyperlipidemia Paternal Grandmother    • Hypertension Paternal Grandmother    • Diabetes Father    • No Known Problems Brother    • No Known Problems Sister    • No Known Problems Sister       Social History:  reports that she has never smoked. She has never used smokeless tobacco.   reports that she does not drink alcohol.   reports that she does not use drugs.        Review of Systems   Constitutional: Negative for chills and fever.   HENT: Negative for sore throat and voice change.    Eyes: Negative for pain and redness.   Respiratory: Negative for chest tightness and shortness of breath.    Cardiovascular: Negative for chest pain and leg swelling.   Gastrointestinal: Positive for abdominal pain (with contractions). Negative for abdominal distention.   Endocrine: Negative for cold intolerance and heat intolerance.   Genitourinary: Negative for difficulty urinating and dysuria.   Musculoskeletal: Negative for arthralgias and myalgias.   Skin: Negative for color change and rash.   Neurological: Negative for dizziness and headaches.   Psychiatric/Behavioral: Negative for agitation and confusion.       Objective      Vitals:    11/09/19 0638   BP: 115/71   Pulse: 107   Resp:    Temp:    SpO2: 94%         Documented Vitals    11/09/19 0700   Weight: 73 kg (161 lb)   Height: 157.5 cm (62\")       Physical Exam   Constitutional: She is oriented to person, place, and time. She appears well-developed and well-nourished.   HENT:   Head: Normocephalic and atraumatic.   Right Ear: External ear normal.   Left Ear: External ear normal.   Eyes: Conjunctivae and EOM are normal. Pupils are equal, round, and reactive to light.   Neck: Normal range of motion. Neck supple.   Cardiovascular: Normal rate and regular rhythm.   Pulmonary/Chest: Effort normal and breath sounds normal.   Abdominal:   Appropriate for gestational age   Musculoskeletal: Normal range of motion.   Neurological: She is " alert and oriented to person, place, and time.   Skin: Skin is warm and dry.   Psychiatric: She has a normal mood and affect. Her behavior is normal.     Constitutional: Appears to be in no acute distress; Eyes: Sclera normal; Endocrine system: Thyroid palpate is normal; Pulmonary system: Lungs clear; Cardiovascular system: Heart regular rate and rhythm; Gastrointestinal system: Abdomen soft, nontender, active bowel sounds; Urologic system: CVA negative; Psychiatric: Appropriate insight; Neurologic: Gait within normal limits.    Cervix: Exam by: Method: sterile exam per RN   Dilation:  6-7   Effacement: Cervical Effacement: 100%   Station:       Last Labs  Lab Results   Component Value Date    WBC 9.03 2019    RBC 4.28 2019    HGB 12.3 2019    HCT 35.4 2019    MCV 82.7 2019    MCH 28.7 2019    MCHC 34.7 2019    RDW 13.3 2019    RDWSD 39.9 2019    MPV 9.1 2019     2019        Lab Results   Component Value Date    GLUCOSE 97 10/16/2019    BUN 7 10/16/2019    CREATININE 0.44 (L) 10/16/2019     (L) 10/16/2019    K 4.1 10/16/2019    CL 97 (L) 10/16/2019    CO2 21.7 (L) 10/16/2019    CALCIUM 9.1 10/16/2019    PROTEINTOT 7.1 10/16/2019    ALBUMIN 3.60 10/16/2019    ALT 10 10/16/2019    AST 13 10/16/2019    ALKPHOS 96 10/16/2019    BILITOT 0.2 10/16/2019    EGFRIFNONA >150 10/16/2019    GLOB 3.5 10/16/2019    AGRATIO 1.0 10/16/2019    BCR 15.9 10/16/2019    ANIONGAP 12.3 10/16/2019       No results found for: HCGQUAL      Assessment/Plan       Pregnancy      Assessment/Plan:   29 y.o. female  currently at 35w1d who presents following rupture of membranes and in labor. Appropriate fetal status.     1. Routine labor care  2. Epidural      Marco A Childs and CONTRERAS have discussed pain goals for this hospitalization after reviewing her current clinical condition, medical history and prior pain experiences.  The goal is to keep her pain level 3-4.  To  help achieve this, I plan to provide IV pain medications. Patient has accepted an epidural.      This document has been electronically signed by Daniel Maguire MD on November 9, 2019 7:57 AM

## 2019-11-09 NOTE — ANESTHESIA PREPROCEDURE EVALUATION
Anesthesia Evaluation     NPO Solid Status: > 8 hours             Airway   Mallampati: II  TM distance: >3 FB  Neck ROM: full  No difficulty expected  Dental - normal exam     Pulmonary     breath sounds clear to auscultation  Cardiovascular   Exercise tolerance: excellent (>7 METS)    Rhythm: regular  Rate: normal        Neuro/Psych  GI/Hepatic/Renal/Endo      Musculoskeletal     Abdominal    Substance History      OB/GYN    (+) Pregnant,         Other                        Anesthesia Plan    ASA 2     epidural       Anesthetic plan, all risks, benefits, and alternatives have been provided, discussed and informed consent has been obtained with: patient.    Plan discussed with CRNA.

## 2019-11-09 NOTE — ADDENDUM NOTE
Addendum  created 11/09/19 1200 by Josué Ho CRNA    Intraprocedure LDAs edited, LDA properties accepted

## 2019-11-09 NOTE — ANESTHESIA POSTPROCEDURE EVALUATION
Patient: Marco A Childs    Procedure Summary     Date:  11/09/19 Room / Location:      Anesthesia Start:  0747 Anesthesia Stop:  1148    Procedure:  LABOR ANALGESIA Diagnosis:      Scheduled Providers:   Provider:  Josué Ho CRNA    Anesthesia Type:  epidural ASA Status:  2          Anesthesia Type: epidural  Last vitals  BP   113/68 (11/09/19 1135)   Temp   98.2 °F (36.8 °C) (11/09/19 1035)   Pulse   107 (11/09/19 1135)   Resp   16 (11/09/19 1135)     SpO2   99 % (11/09/19 0813)     Post Anesthesia Care and Evaluation    Patient location during evaluation: bedside  Patient participation: complete - patient participated  Level of consciousness: awake and alert  Pain score: 0  Pain management: adequate  Airway patency: patent  Anesthetic complications: No anesthetic complications  PONV Status: none  Cardiovascular status: hemodynamically stable  Respiratory status: spontaneous ventilation  Hydration status: acceptable  Post Neuraxial Block status: No signs or symptoms of PDPH  Comments: Able to lift legs and push herself up in bed. Instructed not to get up without RN. Agrees.

## 2019-11-09 NOTE — PLAN OF CARE
Problem: Anesthesia/Analgesia, Neuraxial (Obstetrics)  Goal: Signs and Symptoms of Listed Potential Problems Will be Absent, Minimized or Managed (Anesthesia/Analgesia, Neuraxial)  Outcome: Outcome(s) achieved Date Met: 11/09/19

## 2019-11-09 NOTE — L&D DELIVERY NOTE
[facility]  Vaginal Delivery Note    Delivery     Delivery: Vaginal, Spontaneous     YOB: 2018    Time of Birth: 10:11 AM      Anesthesia: Epidural     Delivering clinician: Helder Mckeon       Delivery narrative: Patient progressed to complete complete and with good maternal effort delivered a male infant over an intact perineum in the OA presentation.  Right anterior shoulder delivered followed by posterior shoulder and corpus infant was placed on maternal abdomen cord was clamped x2 and cut and infant was taken to the warmer for evaluation.  Placenta delivered intact without difficulty.  Second-degree midline laceration was noted and was repaired in the normal fashion using 3-0 Vicryl.  A single figure-of-eight suture was needed at the hymenal ring for additional bleeding.  Once suturing was finished bleeding was minimal.  Patient tolerated procedure well mother and infant were stable when I left the room.    Complications  none    Infant    Findings: male  infant     Infant observations: Weight: 2381 g (5 lb 4 oz)   Length: 18.701  in  Observations/Comments:         Apgars: 9   @ 1 minute /    9   @ 5 minutes     Placenta, Cord, and Fluid    Placenta delivered  Spontaneous  at   11/9/2019 10:15 AM     Cord: 3 vessels  present.   Nuchal Cord?  no   Cord blood obtained: Yes    Cord gases obtained:  No      Repair    Episiotomy: None    Lacerations: Yes  Laceration Information  Laceration Repaired?   Perineal:   Second-degree   Repaired in the usual fashion with 3-0 Vicryl   Periurethral:         Labial:         Sulcus:         Vaginal:         Cervical:           Suture used for repair: 3-0 Vicryl   Estimated Blood Loss:  300 mL           Disposition  Mother to Mother Baby/Postpartum  in stable condition currently.  Baby to remains with mom  in stable condition currently.

## 2019-11-10 LAB
BASOPHILS # BLD AUTO: 0.03 10*3/MM3 (ref 0–0.2)
BASOPHILS NFR BLD AUTO: 0.3 % (ref 0–1.5)
DEPRECATED RDW RBC AUTO: 42 FL (ref 37–54)
EOSINOPHIL # BLD AUTO: 0.09 10*3/MM3 (ref 0–0.4)
EOSINOPHIL NFR BLD AUTO: 0.9 % (ref 0.3–6.2)
ERYTHROCYTE [DISTWIDTH] IN BLOOD BY AUTOMATED COUNT: 13.6 % (ref 12.3–15.4)
HCT VFR BLD AUTO: 29.9 % (ref 34–46.6)
HGB BLD-MCNC: 10.2 G/DL (ref 12–15.9)
IMM GRANULOCYTES # BLD AUTO: 0.08 10*3/MM3 (ref 0–0.05)
IMM GRANULOCYTES NFR BLD AUTO: 0.8 % (ref 0–0.5)
LYMPHOCYTES # BLD AUTO: 2.06 10*3/MM3 (ref 0.7–3.1)
LYMPHOCYTES NFR BLD AUTO: 20.7 % (ref 19.6–45.3)
MCH RBC QN AUTO: 28.7 PG (ref 26.6–33)
MCHC RBC AUTO-ENTMCNC: 34.1 G/DL (ref 31.5–35.7)
MCV RBC AUTO: 84 FL (ref 79–97)
MONOCYTES # BLD AUTO: 0.66 10*3/MM3 (ref 0.1–0.9)
MONOCYTES NFR BLD AUTO: 6.6 % (ref 5–12)
NEUTROPHILS # BLD AUTO: 7.05 10*3/MM3 (ref 1.7–7)
NEUTROPHILS NFR BLD AUTO: 70.7 % (ref 42.7–76)
NRBC BLD AUTO-RTO: 0 /100 WBC (ref 0–0.2)
PLATELET # BLD AUTO: 244 10*3/MM3 (ref 140–450)
PMV BLD AUTO: 9.5 FL (ref 6–12)
RBC # BLD AUTO: 3.56 10*6/MM3 (ref 3.77–5.28)
WBC NRBC COR # BLD: 9.97 10*3/MM3 (ref 3.4–10.8)

## 2019-11-10 PROCEDURE — 99024 POSTOP FOLLOW-UP VISIT: CPT | Performed by: OBSTETRICS & GYNECOLOGY

## 2019-11-10 PROCEDURE — 85025 COMPLETE CBC W/AUTO DIFF WBC: CPT | Performed by: STUDENT IN AN ORGANIZED HEALTH CARE EDUCATION/TRAINING PROGRAM

## 2019-11-10 RX ORDER — ACETAMINOPHEN 500 MG
1000 TABLET ORAL EVERY 6 HOURS PRN
Status: DISCONTINUED | OUTPATIENT
Start: 2019-11-10 | End: 2019-11-11 | Stop reason: HOSPADM

## 2019-11-10 RX ADMIN — ACETAMINOPHEN 1000 MG: 500 TABLET ORAL at 20:59

## 2019-11-10 RX ADMIN — IBUPROFEN 600 MG: 600 TABLET ORAL at 13:36

## 2019-11-10 NOTE — PLAN OF CARE
Problem: Patient Care Overview  Goal: Plan of Care Review  Outcome: Ongoing (interventions implemented as appropriate)   11/10/19 1322   Coping/Psychosocial   Plan of Care Reviewed With patient   Plan of Care Review   Progress improving   OTHER   Outcome Summary vss; pain controlled with prn pain meds; bonding appropriately with infant     Goal: Individualization and Mutuality  Outcome: Ongoing (interventions implemented as appropriate)    Goal: Discharge Needs Assessment  Outcome: Ongoing (interventions implemented as appropriate)    Goal: Interprofessional Rounds/Family Conf  Outcome: Ongoing (interventions implemented as appropriate)      Problem: Postpartum (Vaginal Delivery) (Adult,Obstetrics,Pediatric)  Goal: Signs and Symptoms of Listed Potential Problems Will be Absent, Minimized or Managed (Postpartum)  Outcome: Ongoing (interventions implemented as appropriate)      Problem: Breastfeeding (Adult,Obstetrics,Pediatric)  Goal: Signs and Symptoms of Listed Potential Problems Will be Absent, Minimized or Managed (Breastfeeding)  Outcome: Ongoing (interventions implemented as appropriate)

## 2019-11-10 NOTE — PLAN OF CARE
Problem: Patient Care Overview  Goal: Plan of Care Review  Outcome: Ongoing (interventions implemented as appropriate)   11/10/19 1543   Coping/Psychosocial   Plan of Care Reviewed With patient   Plan of Care Review   Progress improving   OTHER   Outcome Summary VSS; ambulates in the room; voids; pain controlled with PO pain medication; FF and bleeding light; no complaints at this time     Goal: Individualization and Mutuality  Outcome: Ongoing (interventions implemented as appropriate)    Goal: Discharge Needs Assessment  Outcome: Ongoing (interventions implemented as appropriate)    Goal: Interprofessional Rounds/Family Conf  Outcome: Ongoing (interventions implemented as appropriate)      Problem: Postpartum (Vaginal Delivery) (Adult,Obstetrics,Pediatric)  Goal: Signs and Symptoms of Listed Potential Problems Will be Absent, Minimized or Managed (Postpartum)  Outcome: Ongoing (interventions implemented as appropriate)      Problem: Breastfeeding (Adult,Obstetrics,Pediatric)  Goal: Signs and Symptoms of Listed Potential Problems Will be Absent, Minimized or Managed (Breastfeeding)  Outcome: Ongoing (interventions implemented as appropriate)

## 2019-11-11 VITALS
HEART RATE: 99 BPM | BODY MASS INDEX: 29.63 KG/M2 | HEIGHT: 62 IN | OXYGEN SATURATION: 99 % | WEIGHT: 161 LBS | DIASTOLIC BLOOD PRESSURE: 77 MMHG | TEMPERATURE: 98 F | SYSTOLIC BLOOD PRESSURE: 117 MMHG | RESPIRATION RATE: 18 BRPM

## 2019-11-11 PROCEDURE — 99024 POSTOP FOLLOW-UP VISIT: CPT | Performed by: OBSTETRICS & GYNECOLOGY

## 2019-11-11 RX ORDER — IBUPROFEN 600 MG/1
600 TABLET ORAL EVERY 8 HOURS PRN
Qty: 60 TABLET | Refills: 2 | Status: SHIPPED | OUTPATIENT
Start: 2019-11-11 | End: 2021-04-07

## 2019-11-11 RX ORDER — ACETAMINOPHEN 500 MG
1000 TABLET ORAL EVERY 6 HOURS PRN
Qty: 60 TABLET | Refills: 2 | Status: SHIPPED | OUTPATIENT
Start: 2019-11-11 | End: 2021-04-07

## 2019-11-11 RX ADMIN — IBUPROFEN 600 MG: 600 TABLET ORAL at 05:10

## 2019-11-11 NOTE — DISCHARGE INSTRUCTIONS
Call for temp above 100.5, lumps or red streaks in breast, increased vaginal bleeding or passing of numerous clots, leg cramps or uncontrolled pain

## 2019-11-11 NOTE — PLAN OF CARE
Problem: Patient Care Overview  Goal: Plan of Care Review  Outcome: Ongoing (interventions implemented as appropriate)   11/11/19 0504   Coping/Psychosocial   Plan of Care Reviewed With patient   Plan of Care Review   Progress improving   OTHER   Outcome Summary VSS, ambulates in room, fundus firm, lochia light, pain controlled with po pain medications, tolerating PO intake      Goal: Individualization and Mutuality  Outcome: Ongoing (interventions implemented as appropriate)    Goal: Discharge Needs Assessment  Outcome: Ongoing (interventions implemented as appropriate)    Goal: Interprofessional Rounds/Family Conf  Outcome: Ongoing (interventions implemented as appropriate)      Problem: Postpartum (Vaginal Delivery) (Adult,Obstetrics,Pediatric)  Goal: Signs and Symptoms of Listed Potential Problems Will be Absent, Minimized or Managed (Postpartum)  Outcome: Ongoing (interventions implemented as appropriate)      Problem: Breastfeeding (Adult,Obstetrics,Pediatric)  Goal: Signs and Symptoms of Listed Potential Problems Will be Absent, Minimized or Managed (Breastfeeding)  Outcome: Ongoing (interventions implemented as appropriate)

## 2019-11-11 NOTE — PLAN OF CARE
Problem: Breastfeeding (Adult,Obstetrics,Pediatric)  Goal: Signs and Symptoms of Listed Potential Problems Will be Absent, Minimized or Managed (Breastfeeding)  Outcome: Ongoing (interventions implemented as appropriate)  Infant is premature. Breastfeeding is improving but not optimal. Supplementation started. Mother will be sent home with supplementation instructions. Lactation follow up appointment scheduled in 1 week.

## 2019-11-11 NOTE — DISCHARGE SUMMARY
[facility]  Marco A Childs  : 1990  MRN: [id number]  CSN: [id number]    Discharge Summary:    Date of Admission: 2019  Date of Discharge:  2019    Admitting Diagnosis:  1. IUP @ 35w1d  2. in labor     Discharge Diagnosis:  1. S/P        History and Hospital Course:  Patient is a   who presents in labor.  Her pregnancy was complicated by  rupture of membranes at 35 weeks, transfer of care during pregnancy from [place].  Please see History and Physical for full details.       She was admitted and progressed in labor with epidural analgesia to completely dilated. She had a vaginal delivery of a  viable male   infant who weighed 2381 g (5 lb 4 oz)  and APGARs of        APGARS  One minute Five minutes Ten minutes Fifteen minutes Twenty minutes   Skin color: 1   1             Heart rate: 2   2             Grimace: 2   2              Muscle tone: 2   2              Breathin   2              Totals: 9   9              . No immediate complications were encountered. Please see procedure note for full details.      Her postpartum course has been unremarkable. She had no signs or symptoms of acute blood loss anemia. She was ambulating well, voiding without difficulty and lochia was within normal limits. She was breast feeding without difficulty. She was stable for discharge on postpartum day 2.      Precautions and instructions were discussed with her including but not limited to maintaining a regular diet at home, practicing local hygiene, pelvic rest, and signs and symptoms to report including heavy bleeding, frequent passage of clots, fainting or dizziness, foul odor of lochia, increasing pain, fever, or any other concerns.    She was asked to follow up in the office in 4 weeks.    Condition: Stable  Discharge Disposition: home  Discharge Diet:   Diet Instructions     Diet: Regular      Discharge Diet:  Regular        Activity at Discharge:   Activity Instructions     Bathing  Restrictions      Type of Restriction:  Bathing    Bathing Restrictions:  Other    Explain Bathing Restrictions:  No soaking in bathtub for 4 weeks, showers are fine.    Driving Restrictions      Type of Restriction:  Driving    Driving Restrictions:  No Driving (Time Limited)    Length:  Other    Indicate Length of Restriction:  No driving for 1 week or while on narcotic pain medications. Riding is car is fine.    Lifting Restrictions      Type of Restriction:  Lifting    Lifting Restrictions:  Other    Explain Lifing Restrictions:  No lifting more than infant and baby carrier together for 6 weeks.    Pelvic Rest      Nothing in the vagina for 6 weeks to include tampons or intercourse.    Sexual Activity Restrictions      Type of Restriction:  Sex    Explain Sexual Activity Restrictions:  No sexual intercourse for at least 6 weeks        Discharge Medications:       Discharge Medications      New Medications      Instructions Start Date   acetaminophen 500 MG tablet  Commonly known as:  TYLENOL   1,000 mg, Oral, Every 6 Hours PRN      ibuprofen 600 MG tablet  Commonly known as:  ADVIL,MOTRIN   600 mg, Oral, Every 8 Hours PRN         Continue These Medications      Instructions Start Date   prenatal (CLASSIC) vitamin 28-0.8 MG tablet tablet   Oral, Daily           Patient will restart all home medications including prenatal vitamins.        This document has been electronically signed by Helder Mckeon DO on November 11, 2019 6:44 AM

## 2019-11-26 ENCOUNTER — OFFICE VISIT (OUTPATIENT)
Dept: OBSTETRICS AND GYNECOLOGY | Facility: CLINIC | Age: 29
End: 2019-11-26

## 2019-11-26 VITALS
DIASTOLIC BLOOD PRESSURE: 78 MMHG | WEIGHT: 154.8 LBS | BODY MASS INDEX: 28.49 KG/M2 | HEIGHT: 62 IN | SYSTOLIC BLOOD PRESSURE: 120 MMHG

## 2019-11-26 PROCEDURE — 99024 POSTOP FOLLOW-UP VISIT: CPT | Performed by: OBSTETRICS & GYNECOLOGY

## 2019-11-26 NOTE — PROGRESS NOTES
"Chief complaint: Postpartum visit    SUBJECTIVE:   Pt is a 29 y.o.  now s/p  2 weeks ago. Pt denies fever, abdominal pain, n/v/d/c, VB, Pt currently breast and bottle feeding and taking PNV, ambulating without difficulty, urinating and stooling without complaints and tolerating normal diet. Pt and  have not had intercourse.  Denies any depression symptoms, no SI or HI currently.  Does not desire any contraception at this time as  is not here.  Patient does have some vaginal discharge but no itching or burning at this time.  OBJECTIVE:  /78   Ht 157.5 cm (62\")   Wt 70.2 kg (154 lb 12.8 oz)   LMP 2019   BMI 28.31 kg/m²     Review of Systems   Constitutional: Negative for chills, fatigue and fever.   HENT: Negative for sore throat.    Eyes: Negative for visual disturbance.   Respiratory: Negative for cough, shortness of breath and wheezing.    Cardiovascular: Negative for chest pain, palpitations and leg swelling.   Gastrointestinal: Negative for abdominal pain, diarrhea, nausea and vomiting.   Genitourinary: Negative for dysuria, flank pain, frequency, vaginal bleeding, vaginal discharge and vaginal pain.   Neurological: Negative for syncope, light-headedness and headaches.   Psychiatric/Behavioral: Negative for dysphoric mood and suicidal ideas. The patient is not nervous/anxious.        Physical Exam   Constitutional: She is oriented to person, place, and time. She appears well-developed and well-nourished. No distress.   HENT:   Head: Normocephalic.   Cardiovascular: Normal rate, regular rhythm, normal heart sounds and intact distal pulses.   No murmur heard.  Pulmonary/Chest: Effort normal and breath sounds normal. No respiratory distress. She has no wheezes.   Abdominal: Soft. Bowel sounds are normal. She exhibits no distension and no mass. There is no tenderness. There is no rebound and no guarding.   Musculoskeletal: Normal range of motion. She exhibits no edema, " tenderness or deformity.   Neurological: She is alert and oriented to person, place, and time.   Skin: Skin is warm and dry. No erythema.   Psychiatric: She has a normal mood and affect. Her behavior is normal. Judgment and thought content normal.   Vitals reviewed.      ASSESSMENT:    Pt is a 29 y.o.  s/p  doing well and recovering appropriately  PLAN:   1.  Does not desire anything for contraception at this time  2. Pt to continue to increase exercise/daily activities as tolerated   3. Continue prenatal vitamins   4. f/u in 4 weeks.    Med reconciliation completed.        This document has been electronically signed by Helder Mckeon DO on 2019 3:10 PM

## 2019-12-02 ENCOUNTER — TELEPHONE (OUTPATIENT)
Dept: OBSTETRICS AND GYNECOLOGY | Facility: CLINIC | Age: 29
End: 2019-12-02

## 2019-12-02 NOTE — TELEPHONE ENCOUNTER
PATIENT IS NEEDING TO GET SOMETHING FOR HER DISCHARGE, SHE SAYS IT NOW HAS AN ODOR. SHE USES [facility]

## 2019-12-04 RX ORDER — METRONIDAZOLE 500 MG/1
500 TABLET ORAL 2 TIMES DAILY
Qty: 14 TABLET | Refills: 0 | Status: SHIPPED | OUTPATIENT
Start: 2019-12-04 | End: 2019-12-11

## 2019-12-04 RX ORDER — FLUCONAZOLE 150 MG/1
150 TABLET ORAL DAILY
Qty: 2 TABLET | Refills: 0 | Status: SHIPPED | OUTPATIENT
Start: 2019-12-04 | End: 2019-12-20

## 2019-12-20 ENCOUNTER — OFFICE VISIT (OUTPATIENT)
Dept: OBSTETRICS AND GYNECOLOGY | Facility: CLINIC | Age: 29
End: 2019-12-20

## 2019-12-20 VITALS
WEIGHT: 153 LBS | BODY MASS INDEX: 28.16 KG/M2 | SYSTOLIC BLOOD PRESSURE: 102 MMHG | DIASTOLIC BLOOD PRESSURE: 64 MMHG | HEIGHT: 62 IN

## 2019-12-20 PROCEDURE — 99024 POSTOP FOLLOW-UP VISIT: CPT | Performed by: OBSTETRICS & GYNECOLOGY

## 2019-12-20 RX ORDER — NORGESTIMATE AND ETHINYL ESTRADIOL 0.25-0.035
1 KIT ORAL DAILY
Qty: 28 TABLET | Refills: 12 | Status: SHIPPED | OUTPATIENT
Start: 2019-12-20 | End: 2021-04-07

## 2019-12-22 NOTE — PROGRESS NOTES
"Chief complaint: Postpartum visit    SUBJECTIVE:   Pt is a 29 y.o.  now s/p  about 6 weeks ago. Pt denies fever, abdominal pain, n/v/d/c, VB, Pt currently bottle feeding and taking PNV, ambulating without difficulty, urinating and stooling without complaints and tolerating normal diet.  Patient has not had intercourse yet.  Denies any depression. Desires OCP  for contraception.  OBJECTIVE:  /64   Ht 157.5 cm (62\")   Wt 69.4 kg (153 lb)   LMP 2019   BMI 27.98 kg/m²     Review of Systems   Constitutional: Negative for chills, fatigue and fever.   HENT: Negative for sore throat.    Eyes: Negative for visual disturbance.   Respiratory: Negative for cough, shortness of breath and wheezing.    Cardiovascular: Negative for chest pain, palpitations and leg swelling.   Gastrointestinal: Negative for abdominal pain, diarrhea, nausea and vomiting.   Genitourinary: Negative for dysuria, flank pain, frequency, vaginal bleeding, vaginal discharge and vaginal pain.   Neurological: Negative for syncope, light-headedness and headaches.   Psychiatric/Behavioral: Negative for dysphoric mood and suicidal ideas. The patient is not nervous/anxious.        Physical Exam   Constitutional: She is oriented to person, place, and time. She appears well-developed and well-nourished. No distress.   HENT:   Head: Normocephalic.   Cardiovascular: Normal rate, regular rhythm, normal heart sounds and intact distal pulses.   No murmur heard.  Pulmonary/Chest: Effort normal and breath sounds normal. No respiratory distress. She has no wheezes.   Abdominal: Soft. Bowel sounds are normal. She exhibits no distension and no mass. There is no tenderness. There is no rebound and no guarding.   Musculoskeletal: Normal range of motion. She exhibits no edema, tenderness or deformity.   Neurological: She is alert and oriented to person, place, and time.   Skin: Skin is warm and dry. No erythema.   Psychiatric: She has a normal mood " and affect. Her behavior is normal. Judgment and thought content normal.   Vitals reviewed.      ASSESSMENT:    Pt is a 29 y.o.  s/p  doing well and recovering appropriately  PLAN:   1. OCP for contraception  2. Pt to continue to increase exercise/daily activities as tolerated   3. Continue prenatal vitamins   4. f/u prn    Med reconciliation completed.        This document has been electronically signed by Helder Mckeon DO on 2019 10:52 PM

## 2021-03-23 RX ORDER — NORGESTIMATE AND ETHINYL ESTRADIOL 0.25-0.035
1 KIT ORAL DAILY
Qty: 28 TABLET | Refills: 12 | OUTPATIENT
Start: 2021-03-23

## 2021-04-07 ENCOUNTER — OFFICE VISIT (OUTPATIENT)
Dept: OBSTETRICS AND GYNECOLOGY | Facility: CLINIC | Age: 31
End: 2021-04-07

## 2021-04-07 VITALS
HEIGHT: 62 IN | BODY MASS INDEX: 28.52 KG/M2 | DIASTOLIC BLOOD PRESSURE: 64 MMHG | WEIGHT: 155 LBS | SYSTOLIC BLOOD PRESSURE: 102 MMHG

## 2021-04-07 DIAGNOSIS — Z30.41 ENCOUNTER FOR SURVEILLANCE OF CONTRACEPTIVE PILLS: Primary | ICD-10-CM

## 2021-04-07 PROBLEM — Z34.90 PREGNANCY: Status: RESOLVED | Noted: 2019-11-09 | Resolved: 2021-04-07

## 2021-04-07 PROBLEM — Z34.03 ENCOUNTER FOR SUPERVISION OF NORMAL FIRST PREGNANCY IN THIRD TRIMESTER: Status: RESOLVED | Noted: 2019-09-04 | Resolved: 2021-04-07

## 2021-04-07 PROBLEM — R10.2 PAIN OF ROUND LIGAMENT DURING PREGNANCY: Status: RESOLVED | Noted: 2019-10-07 | Resolved: 2021-04-07

## 2021-04-07 PROBLEM — O26.899 PAIN OF ROUND LIGAMENT DURING PREGNANCY: Status: RESOLVED | Noted: 2019-10-07 | Resolved: 2021-04-07

## 2021-04-07 PROCEDURE — 99212 OFFICE O/P EST SF 10 MIN: CPT | Performed by: OBSTETRICS & GYNECOLOGY

## 2021-04-07 RX ORDER — NORGESTIMATE AND ETHINYL ESTRADIOL 0.25-0.035
1 KIT ORAL DAILY
Qty: 28 TABLET | Refills: 12 | Status: SHIPPED | OUTPATIENT
Start: 2021-04-07

## 2021-04-07 NOTE — PROGRESS NOTES
Chief complaint: Birth control follow-up    Patient presents for follow-up on birth control.  Had previously been taking combined oral contraceptive pills however the seem to be affecting her breast-feeding.  She stopped taking the pills and breast-feeding improved.  Since that time patient has stopped breast-feeding and would like to restart on her pills.  Patient would like to start on the same pill that she was on previously.  Prescription has been placed.  No other concerns or complaints at this time.    Awake and oriented x3  No acute distress  Vital signs reviewed    Doing well.  Prescription for birth control replaced.  Patient to follow-up as needed.  If any problems with this birth control return and we will consider switching to another form.

## 2022-11-07 DIAGNOSIS — Z32.01 ENCOUNTER FOR PREGNANCY TEST, RESULT POSITIVE: Primary | ICD-10-CM

## 2022-11-08 ENCOUNTER — LAB (OUTPATIENT)
Dept: LAB | Facility: HOSPITAL | Age: 32
End: 2022-11-08

## 2022-11-08 DIAGNOSIS — Z32.01 ENCOUNTER FOR PREGNANCY TEST, RESULT POSITIVE: ICD-10-CM

## 2022-11-08 LAB — HCG INTACT+B SERPL-ACNC: <1 MIU/ML

## 2022-11-08 PROCEDURE — 84702 CHORIONIC GONADOTROPIN TEST: CPT

## 2022-11-08 PROCEDURE — 36415 COLL VENOUS BLD VENIPUNCTURE: CPT

## 2023-09-20 ENCOUNTER — HOSPITAL ENCOUNTER (EMERGENCY)
Facility: HOSPITAL | Age: 33
Discharge: HOME | End: 2023-09-20
Attending: STUDENT IN AN ORGANIZED HEALTH CARE EDUCATION/TRAINING PROGRAM | Admitting: STUDENT IN AN ORGANIZED HEALTH CARE EDUCATION/TRAINING PROGRAM
Payer: MEDICAID

## 2023-09-20 VITALS
WEIGHT: 160 LBS | RESPIRATION RATE: 17 BRPM | HEART RATE: 87 BPM | DIASTOLIC BLOOD PRESSURE: 87 MMHG | HEIGHT: 62 IN | SYSTOLIC BLOOD PRESSURE: 132 MMHG | OXYGEN SATURATION: 99 % | TEMPERATURE: 97.9 F | BODY MASS INDEX: 29.44 KG/M2

## 2023-09-20 DIAGNOSIS — R21 RASH: Primary | ICD-10-CM

## 2023-09-20 PROCEDURE — 96372 THER/PROPH/DIAG INJ SC/IM: CPT

## 2023-09-20 PROCEDURE — 63710000001 DIPHENHYDRAMINE PER 50 MG: Performed by: STUDENT IN AN ORGANIZED HEALTH CARE EDUCATION/TRAINING PROGRAM

## 2023-09-20 PROCEDURE — 25010000002 METHYLPREDNISOLONE PER 125 MG: Performed by: STUDENT IN AN ORGANIZED HEALTH CARE EDUCATION/TRAINING PROGRAM

## 2023-09-20 PROCEDURE — 99283 EMERGENCY DEPT VISIT LOW MDM: CPT

## 2023-09-20 RX ORDER — FAMOTIDINE 40 MG/1
40 TABLET, FILM COATED ORAL ONCE
Status: COMPLETED | OUTPATIENT
Start: 2023-09-20 | End: 2023-09-20

## 2023-09-20 RX ORDER — DIPHENHYDRAMINE HCL 25 MG
25 CAPSULE ORAL ONCE
Status: COMPLETED | OUTPATIENT
Start: 2023-09-20 | End: 2023-09-20

## 2023-09-20 RX ORDER — METHYLPREDNISOLONE SODIUM SUCCINATE 125 MG/2ML
125 INJECTION, POWDER, LYOPHILIZED, FOR SOLUTION INTRAMUSCULAR; INTRAVENOUS ONCE
Status: DISCONTINUED | OUTPATIENT
Start: 2023-09-20 | End: 2023-09-20

## 2023-09-20 RX ORDER — METHYLPREDNISOLONE SODIUM SUCCINATE 125 MG/2ML
125 INJECTION, POWDER, LYOPHILIZED, FOR SOLUTION INTRAMUSCULAR; INTRAVENOUS ONCE
Status: COMPLETED | OUTPATIENT
Start: 2023-09-20 | End: 2023-09-20

## 2023-09-20 RX ADMIN — METHYLPREDNISOLONE SODIUM SUCCINATE 125 MG: 125 INJECTION INTRAMUSCULAR; INTRAVENOUS at 12:37

## 2023-09-20 RX ADMIN — FAMOTIDINE 40 MG: 40 TABLET, FILM COATED ORAL at 12:36

## 2023-09-20 RX ADMIN — DIPHENHYDRAMINE HYDROCHLORIDE 25 MG: 25 CAPSULE ORAL at 12:36

## 2023-09-20 NOTE — ED PROVIDER NOTES
"Subjective   History of Present Illness  33-year-old female comes to the ER with a pruritic rash that is all over her body.  Before it started the patient states she used a new bleach spray to clean a room.  She has been scratching the rash.  She denies other symptoms.     History provided by:  Patient   used: No      Review of Systems   Constitutional:  Negative for chills and fever.   Skin:  Positive for rash. Negative for color change and wound.   Neurological:  Negative for dizziness, weakness, light-headedness, numbness and headaches.     Past Medical History:   Diagnosis Date    Abnormal laboratory test result     Acne vulgaris     Constipation     Encounter for initial prescription of contraceptive pills     Encounter for surveillance of contraceptive pills     Hirsutism     Irregular periods     Left lower quadrant pain        No Known Allergies    History reviewed. No pertinent surgical history.    Family History   Problem Relation Age of Onset    Breast cancer Mother     Heart disease Maternal Grandfather     Heart attack Maternal Grandfather     Hyperlipidemia Paternal Grandmother     Hypertension Paternal Grandmother     Diabetes Father     No Known Problems Brother     No Known Problems Sister     No Known Problems Sister        Social History     Socioeconomic History    Marital status:    Tobacco Use    Smoking status: Never    Smokeless tobacco: Never   Vaping Use    Vaping Use: Never used   Substance and Sexual Activity    Alcohol use: No    Drug use: No    Sexual activity: Yes     Partners: Male           Objective   Vitals:    09/20/23 1206 09/20/23 1210   BP: 132/87    BP Location: Right arm    Patient Position: Sitting    Pulse: 87    Resp: 17    Temp: 97.9 °F (36.6 °C)    TempSrc: Oral    SpO2: 99%    Weight:  72.6 kg (160 lb)   Height:  157.5 cm (62\")       Physical Exam  Vitals and nursing note reviewed.   Constitutional:       General: She is not in acute " distress.     Appearance: She is well-developed. She is not ill-appearing or diaphoretic.   HENT:      Head: Normocephalic.   Eyes:      Conjunctiva/sclera: Conjunctivae normal.   Pulmonary:      Effort: Pulmonary effort is normal. No accessory muscle usage or respiratory distress.   Skin:     General: Skin is warm and dry.      Capillary Refill: Capillary refill takes less than 2 seconds.      Findings: Rash present.   Neurological:      Mental Status: She is oriented to person, place, and time.       Procedures           ED Course                                           Medical Decision Making  Vital signs are stable, afebrile.  Patient received steroids, Benadryl, Pepcid.  Symptomatic care discussed.  Recommend follow-up with her PCP.  Return precautions given.        Final diagnoses:   Rash       ED Disposition  ED Disposition       ED Disposition   Discharge    Condition   Stable    Comment   --               [facility]  [address] [place] [id number] [phone]  Schedule an appointment as soon as possible for a visit in 2 days  As needed, ER follow up         Medication List      No changes were made to your prescriptions during this visit.            Jagdeep Padron MD  09/20/23 9974